# Patient Record
Sex: MALE | Race: WHITE | NOT HISPANIC OR LATINO
[De-identification: names, ages, dates, MRNs, and addresses within clinical notes are randomized per-mention and may not be internally consistent; named-entity substitution may affect disease eponyms.]

---

## 2018-04-03 ENCOUNTER — FORM ENCOUNTER (OUTPATIENT)
Age: 68
End: 2018-04-03

## 2018-04-04 ENCOUNTER — APPOINTMENT (OUTPATIENT)
Dept: MRI IMAGING | Facility: HOSPITAL | Age: 68
End: 2018-04-04

## 2018-04-04 ENCOUNTER — OUTPATIENT (OUTPATIENT)
Dept: OUTPATIENT SERVICES | Facility: HOSPITAL | Age: 68
LOS: 1 days | End: 2018-04-04
Payer: MEDICARE

## 2018-04-04 DIAGNOSIS — Z00.8 ENCOUNTER FOR OTHER GENERAL EXAMINATION: ICD-10-CM

## 2018-04-04 PROCEDURE — 72148 MRI LUMBAR SPINE W/O DYE: CPT | Mod: 26

## 2018-04-04 PROCEDURE — 72148 MRI LUMBAR SPINE W/O DYE: CPT

## 2018-04-05 ENCOUNTER — APPOINTMENT (OUTPATIENT)
Dept: INTERNAL MEDICINE | Facility: CLINIC | Age: 68
End: 2018-04-05
Payer: MEDICARE

## 2018-04-05 VITALS
RESPIRATION RATE: 14 BRPM | HEART RATE: 75 BPM | WEIGHT: 218 LBS | HEIGHT: 70 IN | DIASTOLIC BLOOD PRESSURE: 92 MMHG | BODY MASS INDEX: 31.21 KG/M2 | SYSTOLIC BLOOD PRESSURE: 152 MMHG

## 2018-04-05 DIAGNOSIS — Z00.00 ENCOUNTER FOR GENERAL ADULT MEDICAL EXAMINATION W/OUT ABNORMAL FINDINGS: ICD-10-CM

## 2018-04-05 DIAGNOSIS — Z78.9 OTHER SPECIFIED HEALTH STATUS: ICD-10-CM

## 2018-04-05 PROCEDURE — 99214 OFFICE O/P EST MOD 30 MIN: CPT

## 2018-04-05 PROCEDURE — G0439: CPT

## 2018-04-05 RX ORDER — ASPIRIN 81 MG
81 TABLET, DELAYED RELEASE (ENTERIC COATED) ORAL DAILY
Qty: 30 | Refills: 0 | Status: ACTIVE | COMMUNITY

## 2018-04-05 RX ORDER — ENALAPRIL MALEATE 5 MG/1
5 TABLET ORAL
Refills: 0 | Status: DISCONTINUED | COMMUNITY
End: 2018-04-05

## 2018-05-22 ENCOUNTER — FORM ENCOUNTER (OUTPATIENT)
Age: 68
End: 2018-05-22

## 2018-05-23 ENCOUNTER — APPOINTMENT (OUTPATIENT)
Dept: MRI IMAGING | Facility: HOSPITAL | Age: 68
End: 2018-05-23
Payer: MEDICARE

## 2018-05-23 ENCOUNTER — OUTPATIENT (OUTPATIENT)
Dept: OUTPATIENT SERVICES | Facility: HOSPITAL | Age: 68
LOS: 1 days | End: 2018-05-23
Payer: MEDICARE

## 2018-05-23 DIAGNOSIS — Z00.8 ENCOUNTER FOR OTHER GENERAL EXAMINATION: ICD-10-CM

## 2018-05-23 PROCEDURE — 73721 MRI JNT OF LWR EXTRE W/O DYE: CPT

## 2018-05-23 PROCEDURE — 73721 MRI JNT OF LWR EXTRE W/O DYE: CPT | Mod: 26,LT

## 2018-05-24 ENCOUNTER — APPOINTMENT (OUTPATIENT)
Dept: INTERNAL MEDICINE | Facility: CLINIC | Age: 68
End: 2018-05-24

## 2018-06-18 LAB
25(OH)D3 SERPL-MCNC: 18.6 NG/ML
ALBUMIN SERPL ELPH-MCNC: 3.9 G/DL
ALP BLD-CCNC: 71 U/L
ALT SERPL-CCNC: 13 U/L
ANION GAP SERPL CALC-SCNC: 14 MMOL/L
APPEARANCE: CLEAR
AST SERPL-CCNC: 13 U/L
BASOPHILS # BLD AUTO: 0.04 K/UL
BASOPHILS NFR BLD AUTO: 0.5 %
BILIRUB SERPL-MCNC: 0.5 MG/DL
BILIRUBIN URINE: NEGATIVE
BLOOD URINE: NEGATIVE
BUN SERPL-MCNC: 13 MG/DL
CALCIUM SERPL-MCNC: 8.9 MG/DL
CHLORIDE SERPL-SCNC: 106 MMOL/L
CHOLEST SERPL-MCNC: 155 MG/DL
CHOLEST/HDLC SERPL: 3.2 RATIO
CO2 SERPL-SCNC: 25 MMOL/L
COLOR: YELLOW
CREAT SERPL-MCNC: 0.75 MG/DL
CRP SERPL HS-MCNC: 0.3 MG/L
EOSINOPHIL # BLD AUTO: 0.37 K/UL
EOSINOPHIL NFR BLD AUTO: 4.7 %
GLUCOSE BS SERPL-MCNC: 95 MG/DL
GLUCOSE QUALITATIVE U: NEGATIVE MG/DL
GLUCOSE SERPL-MCNC: 100 MG/DL
HBA1C MFR BLD HPLC: 5.7 %
HCT VFR BLD CALC: 46.2 %
HDLC SERPL-MCNC: 49 MG/DL
HGB BLD-MCNC: 15 G/DL
IMM GRANULOCYTES NFR BLD AUTO: 0.1 %
KETONES URINE: NEGATIVE
LDLC SERPL CALC-MCNC: 91 MG/DL
LEUKOCYTE ESTERASE URINE: NEGATIVE
LYMPHOCYTES # BLD AUTO: 2.16 K/UL
LYMPHOCYTES NFR BLD AUTO: 27.4 %
MAN DIFF?: NORMAL
MCHC RBC-ENTMCNC: 28.9 PG
MCHC RBC-ENTMCNC: 32.5 GM/DL
MCV RBC AUTO: 89 FL
MONOCYTES # BLD AUTO: 0.7 K/UL
MONOCYTES NFR BLD AUTO: 8.9 %
NEUTROPHILS # BLD AUTO: 4.61 K/UL
NEUTROPHILS NFR BLD AUTO: 58.4 %
NITRITE URINE: NEGATIVE
PH URINE: 7
PLATELET # BLD AUTO: 318 K/UL
POTASSIUM SERPL-SCNC: 4.6 MMOL/L
PROT SERPL-MCNC: 6.2 G/DL
PROTEIN URINE: NEGATIVE MG/DL
PSA FREE FLD-MCNC: 34.4
PSA FREE SERPL-MCNC: 0.21 NG/ML
PSA SERPL-MCNC: 0.61 NG/ML
RBC # BLD: 5.19 M/UL
RBC # FLD: 14.6 %
SODIUM SERPL-SCNC: 145 MMOL/L
SPECIFIC GRAVITY URINE: 1.01
T4 FREE SERPL-MCNC: 1.3 NG/DL
TRIGL SERPL-MCNC: 74 MG/DL
TSH SERPL-ACNC: 2.51 UIU/ML
UROBILINOGEN URINE: 1 MG/DL
VIT B12 SERPL-MCNC: 384 PG/ML
WBC # FLD AUTO: 7.89 K/UL

## 2018-10-05 ENCOUNTER — RX RENEWAL (OUTPATIENT)
Age: 68
End: 2018-10-05

## 2019-04-12 ENCOUNTER — RX RENEWAL (OUTPATIENT)
Age: 69
End: 2019-04-12

## 2019-10-17 ENCOUNTER — RX RENEWAL (OUTPATIENT)
Age: 69
End: 2019-10-17

## 2019-11-16 ENCOUNTER — APPOINTMENT (OUTPATIENT)
Dept: RADIOLOGY | Facility: HOSPITAL | Age: 69
End: 2019-11-16
Payer: MEDICARE

## 2019-11-16 ENCOUNTER — OUTPATIENT (OUTPATIENT)
Dept: OUTPATIENT SERVICES | Facility: HOSPITAL | Age: 69
LOS: 1 days | End: 2019-11-16
Payer: MEDICARE

## 2019-11-16 DIAGNOSIS — Z00.8 ENCOUNTER FOR OTHER GENERAL EXAMINATION: ICD-10-CM

## 2019-11-16 PROCEDURE — 73610 X-RAY EXAM OF ANKLE: CPT

## 2019-11-16 PROCEDURE — 73610 X-RAY EXAM OF ANKLE: CPT | Mod: 26,RT

## 2020-04-16 ENCOUNTER — RX RENEWAL (OUTPATIENT)
Age: 70
End: 2020-04-16

## 2020-04-25 ENCOUNTER — MESSAGE (OUTPATIENT)
Age: 70
End: 2020-04-25

## 2020-05-07 LAB
SARS-COV-2 IGG SERPL IA-ACNC: 0.1 INDEX
SARS-COV-2 IGG SERPL QL IA: NEGATIVE

## 2020-10-16 ENCOUNTER — RX RENEWAL (OUTPATIENT)
Age: 70
End: 2020-10-16

## 2020-10-16 DIAGNOSIS — I10 ESSENTIAL (PRIMARY) HYPERTENSION: ICD-10-CM

## 2020-10-16 DIAGNOSIS — M54.16 RADICULOPATHY, LUMBAR REGION: ICD-10-CM

## 2020-10-16 DIAGNOSIS — I25.10 ATHEROSCLEROTIC HEART DISEASE OF NATIVE CORONARY ARTERY W/OUT ANGINA PECTORIS: ICD-10-CM

## 2020-10-16 DIAGNOSIS — E78.5 HYPERLIPIDEMIA, UNSPECIFIED: ICD-10-CM

## 2021-04-18 ENCOUNTER — RX RENEWAL (OUTPATIENT)
Age: 71
End: 2021-04-18

## 2021-06-03 ENCOUNTER — EMERGENCY (EMERGENCY)
Facility: HOSPITAL | Age: 71
LOS: 1 days | Discharge: HOME | End: 2021-06-03
Attending: EMERGENCY MEDICINE | Admitting: EMERGENCY MEDICINE
Payer: MEDICARE

## 2021-06-03 VITALS — SYSTOLIC BLOOD PRESSURE: 159 MMHG | DIASTOLIC BLOOD PRESSURE: 79 MMHG | HEART RATE: 90 BPM | RESPIRATION RATE: 18 BRPM

## 2021-06-03 VITALS
HEIGHT: 70 IN | OXYGEN SATURATION: 98 % | HEART RATE: 90 BPM | SYSTOLIC BLOOD PRESSURE: 200 MMHG | RESPIRATION RATE: 18 BRPM | WEIGHT: 210.1 LBS | DIASTOLIC BLOOD PRESSURE: 100 MMHG | TEMPERATURE: 98 F

## 2021-06-03 LAB
ALBUMIN SERPL ELPH-MCNC: 3.7 G/DL — SIGNIFICANT CHANGE UP (ref 3.3–5)
ALP SERPL-CCNC: 87 U/L — SIGNIFICANT CHANGE UP (ref 40–120)
ALT FLD-CCNC: 21 U/L — SIGNIFICANT CHANGE UP (ref 10–45)
ANION GAP SERPL CALC-SCNC: 5 MMOL/L — SIGNIFICANT CHANGE UP (ref 5–17)
APTT BLD: 31.5 SEC — SIGNIFICANT CHANGE UP (ref 27.5–35.5)
AST SERPL-CCNC: 20 U/L — SIGNIFICANT CHANGE UP (ref 10–40)
BASOPHILS # BLD AUTO: 0.07 K/UL — SIGNIFICANT CHANGE UP (ref 0–0.2)
BASOPHILS NFR BLD AUTO: 0.8 % — SIGNIFICANT CHANGE UP (ref 0–2)
BILIRUB SERPL-MCNC: 0.8 MG/DL — SIGNIFICANT CHANGE UP (ref 0.2–1.2)
BLD GP AB SCN SERPL QL: SIGNIFICANT CHANGE UP
BUN SERPL-MCNC: 14 MG/DL — SIGNIFICANT CHANGE UP (ref 7–23)
CALCIUM SERPL-MCNC: 9.1 MG/DL — SIGNIFICANT CHANGE UP (ref 8.4–10.5)
CHLORIDE SERPL-SCNC: 108 MMOL/L — SIGNIFICANT CHANGE UP (ref 96–108)
CO2 SERPL-SCNC: 30 MMOL/L — SIGNIFICANT CHANGE UP (ref 22–31)
CREAT SERPL-MCNC: 0.84 MG/DL — SIGNIFICANT CHANGE UP (ref 0.5–1.3)
EOSINOPHIL # BLD AUTO: 0.27 K/UL — SIGNIFICANT CHANGE UP (ref 0–0.5)
EOSINOPHIL NFR BLD AUTO: 3.2 % — SIGNIFICANT CHANGE UP (ref 0–6)
GLUCOSE SERPL-MCNC: 102 MG/DL — HIGH (ref 70–99)
HCT VFR BLD CALC: 38 % — LOW (ref 39–50)
HGB BLD-MCNC: 12.6 G/DL — LOW (ref 13–17)
IMM GRANULOCYTES NFR BLD AUTO: 0.5 % — SIGNIFICANT CHANGE UP (ref 0–1.5)
INR BLD: 1.03 RATIO — SIGNIFICANT CHANGE UP (ref 0.88–1.16)
LYMPHOCYTES # BLD AUTO: 2.15 K/UL — SIGNIFICANT CHANGE UP (ref 1–3.3)
LYMPHOCYTES # BLD AUTO: 25.7 % — SIGNIFICANT CHANGE UP (ref 13–44)
MCHC RBC-ENTMCNC: 28.8 PG — SIGNIFICANT CHANGE UP (ref 27–34)
MCHC RBC-ENTMCNC: 33.2 GM/DL — SIGNIFICANT CHANGE UP (ref 32–36)
MCV RBC AUTO: 87 FL — SIGNIFICANT CHANGE UP (ref 80–100)
MONOCYTES # BLD AUTO: 0.81 K/UL — SIGNIFICANT CHANGE UP (ref 0–0.9)
MONOCYTES NFR BLD AUTO: 9.7 % — SIGNIFICANT CHANGE UP (ref 2–14)
NEUTROPHILS # BLD AUTO: 5.02 K/UL — SIGNIFICANT CHANGE UP (ref 1.8–7.4)
NEUTROPHILS NFR BLD AUTO: 60.1 % — SIGNIFICANT CHANGE UP (ref 43–77)
NRBC # BLD: 0 /100 WBCS — SIGNIFICANT CHANGE UP (ref 0–0)
PLATELET # BLD AUTO: 287 K/UL — SIGNIFICANT CHANGE UP (ref 150–400)
POTASSIUM SERPL-MCNC: 4.1 MMOL/L — SIGNIFICANT CHANGE UP (ref 3.5–5.3)
POTASSIUM SERPL-SCNC: 4.1 MMOL/L — SIGNIFICANT CHANGE UP (ref 3.5–5.3)
PROT SERPL-MCNC: 7.5 G/DL — SIGNIFICANT CHANGE UP (ref 6–8.3)
PROTHROM AB SERPL-ACNC: 12.5 SEC — SIGNIFICANT CHANGE UP (ref 10.6–13.6)
RBC # BLD: 4.37 M/UL — SIGNIFICANT CHANGE UP (ref 4.2–5.8)
RBC # FLD: 13.6 % — SIGNIFICANT CHANGE UP (ref 10.3–14.5)
SARS-COV-2 RNA SPEC QL NAA+PROBE: SIGNIFICANT CHANGE UP
SODIUM SERPL-SCNC: 143 MMOL/L — SIGNIFICANT CHANGE UP (ref 135–145)
WBC # BLD: 8.36 K/UL — SIGNIFICANT CHANGE UP (ref 3.8–10.5)
WBC # FLD AUTO: 8.36 K/UL — SIGNIFICANT CHANGE UP (ref 3.8–10.5)

## 2021-06-03 PROCEDURE — 86901 BLOOD TYPING SEROLOGIC RH(D): CPT

## 2021-06-03 PROCEDURE — 85730 THROMBOPLASTIN TIME PARTIAL: CPT

## 2021-06-03 PROCEDURE — 99284 EMERGENCY DEPT VISIT MOD MDM: CPT | Mod: 25

## 2021-06-03 PROCEDURE — 85025 COMPLETE CBC W/AUTO DIFF WBC: CPT

## 2021-06-03 PROCEDURE — 86900 BLOOD TYPING SEROLOGIC ABO: CPT

## 2021-06-03 PROCEDURE — 74177 CT ABD & PELVIS W/CONTRAST: CPT | Mod: 26,MA

## 2021-06-03 PROCEDURE — 71260 CT THORAX DX C+: CPT

## 2021-06-03 PROCEDURE — 85610 PROTHROMBIN TIME: CPT

## 2021-06-03 PROCEDURE — 36415 COLL VENOUS BLD VENIPUNCTURE: CPT

## 2021-06-03 PROCEDURE — 74177 CT ABD & PELVIS W/CONTRAST: CPT

## 2021-06-03 PROCEDURE — 86850 RBC ANTIBODY SCREEN: CPT

## 2021-06-03 PROCEDURE — 71260 CT THORAX DX C+: CPT | Mod: 26,MA

## 2021-06-03 PROCEDURE — 99284 EMERGENCY DEPT VISIT MOD MDM: CPT

## 2021-06-03 PROCEDURE — 80053 COMPREHEN METABOLIC PANEL: CPT

## 2021-06-03 PROCEDURE — 87635 SARS-COV-2 COVID-19 AMP PRB: CPT

## 2021-06-03 RX ORDER — AMLODIPINE BESYLATE 2.5 MG/1
1 TABLET ORAL
Qty: 0 | Refills: 0 | DISCHARGE

## 2021-06-03 RX ORDER — LISINOPRIL 2.5 MG/1
0 TABLET ORAL
Qty: 0 | Refills: 0 | DISCHARGE

## 2021-06-03 RX ORDER — ATORVASTATIN CALCIUM 80 MG/1
1 TABLET, FILM COATED ORAL
Qty: 0 | Refills: 0 | DISCHARGE

## 2021-06-03 NOTE — ED ADULT TRIAGE NOTE - CHIEF COMPLAINT QUOTE
I fell and have some subcutaneous bleeding. Dr Wing is going to meet me here. I need a CT scan and a CBC

## 2021-06-03 NOTE — ED PROVIDER NOTE - PATIENT PORTAL LINK FT
You can access the FollowMyHealth Patient Portal offered by [facility] by registering at the following website: [email or web address]. By joining [employer]’s FollowMyHealth portal, you will also be able to view your health information using other applications (apps) compatible with our system.

## 2021-06-03 NOTE — ED PROVIDER NOTE - ATTENDING CONTRIBUTION TO CARE
Eval with ARAMIS Gallegos. Patient with right flank and abd hematoma s/p fall 4 days ago. Expanding area of bruising. Sent by Dr Wing for labs and CT.   D/W Dr Wing. CT without deep intraabdominal or chest cavity findings. Superficial hematoma/bruising. Stable for d/c. H/H normal.     I performed a face to face bedside interview with patient regarding history of present illness, review of symptoms and past medical history. I completed an independent physical exam.  I have discussed the patient's plan of care with Physician Assistant (PA). I agree with note as stated above, having amended the EMR as needed to reflect my findings.   This includes History of Present Illness, HIV, Past Medical/Surgical/Family/Social History, Allergies and Home Medications, Review of Systems, Physical Exam, and any Progress Notes during the time I functioned as the attending physician for this patient.

## 2021-06-03 NOTE — ED PROVIDER NOTE - NSFOLLOWUPINSTRUCTIONS_ED_ALL_ED_FT
Hematoma    A hematoma is a collection of blood. A hematoma can happen:  •Under the skin.      •In an organ.      •In a body space.      •In a joint space.      •In other tissues.      The blood can thicken (clot) to form a lump that you can see and feel. The lump is often hard and may become sore and tender. The lump can be very small or very big. Most hematomas get better in a few days to weeks. However, some hematomas may be serious and need medical care.      What are the causes?  This condition is caused by:  •An injury.      •Blood that leaks under the skin.      •Problems from surgeries.      •Medical conditions that cause bleeding or bruising.        What increases the risk?  You are more likely to develop this condition if:  •You are an older adult.      •You use medicines that thin your blood.        What are the signs or symptoms?  Symptoms depend on where the hematoma is in your body.•If the hematoma is under the skin, there is:  •A firm lump on the body.       •Pain and tenderness in the area.       •Bruising. The skin above the lump may be blue, dark blue, purple-red, or yellowish.      •If the hematoma is deep in the tissues or body spaces, there may be:  •Blood in the stomach. This may cause pain in the belly (abdomen), weakness, passing out (fainting), and shortness of breath.      •Blood in the head. This may cause a headache, weakness, trouble speaking or understanding speech, or passing out.          How is this diagnosed?  This condition is diagnosed based on:  •Your medical history.       •A physical exam.       •Imaging tests, such as ultrasound or CT scan.      •Blood tests.        How is this treated?  Treatment depends on the cause, size, and location of the hematoma. Treatment may include:  •Doing nothing. Many hematomas go away on their own without treatment.      •Surgery or close monitoring. This may be needed for large hematomas or hematomas that affect the body's organs.      •Medicines. These may be given if a medical condition caused the hematoma.        Follow these instructions at home:      Managing pain, stiffness, and swelling    •If told, put ice on the area.  •Put ice in a plastic bag.      •Place a towel between your skin and the bag.      •Leave the ice on for 20 minutes, 2–3 times a day for the first two days.      •If told, put heat on the affected area after putting ice on the area for two days. Use the heat source that your doctor tells you to use. This could be a moist heat pack or a heating pad. To do this:  •Place a towel between your skin and the heat source.      •Leave the heat on for 20–30 minutes.      •Remove the heat if your skin turns bright red. This is very important if you are unable to feel pain, heat, or cold. You may have a greater risk of getting burned.        •Raise (elevate) the affected area above the level of your heart while you are sitting or lying down.      •Wrap the affected area with an elastic bandage, if told by your doctor. Do not wrap the bandage too tightly.      •If your hematoma is on a leg or foot and is painful, your doctor may give you crutches. Use them as told by your doctor.      General instructions     •Take over-the-counter and prescription medicines only as told by your doctor.      •Keep all follow-up visits as told by your doctor. This is important.        Contact a doctor if:    •You have a fever.      •The swelling or bruising gets worse.      •You start to get more hematomas.        Get help right away if:    •Your pain gets worse.      •Your pain is not getting better with medicine.      •Your skin over the hematoma breaks or starts to bleed.    •Your hematoma is in your chest or belly and you:  •Pass out.      •Feel weak.      •Become short of breath.      •You have a hematoma on your scalp that is caused by a fall or injury, and you:  •Have a headache that gets worse.      •Have trouble speaking or understanding speech.      •Become less alert or you pass out.          Summary    •A hematoma is a collection of blood in any part of your body.      •Most hematomas get better on their own in a few days to weeks. Some may need medical care.      •Follow instructions from your doctor about how to care for your hematoma.      •Contact a doctor if the swelling or bruising gets worse, or if you are short of breath.      This information is not intended to replace advice given to you by your health care provider. Make sure you discuss any questions you have with your health care provider.

## 2021-06-03 NOTE — ED PROVIDER NOTE - OBJECTIVE STATEMENT
pt 69 yo m fell 4 days ago and landed on his abdomen. now c/o worsening ecchymosis spreading. no other injuries  denies pain, weakness, numbness, dizziness, lightheadedness. no AC

## 2021-06-03 NOTE — ED PROVIDER NOTE - CLINICAL SUMMARY MEDICAL DECISION MAKING FREE TEXT BOX
pt 69 yo m fell 4 days ago and landed on his abdomen. now c/o worsening ecchymosis spreading. no other injuries  denies pain, weakness, numbness, dizziness, lightheadedness. no AC  Abd:     extensive ecchymosis across abdomen radiating to right flank and right groin. area of superficial swelling RLQ wo fluctuance  h/h wnl. ct shows no active bleed  saw dr. tinoco and will fu as out pt

## 2021-06-03 NOTE — ED PROVIDER NOTE - PHYSICAL EXAMINATION
General:     NAD, well-nourished, well-appearing  Eyes: PERRL  Head:     NC/AT, EOMI, oral mucosa moist  Neck:     trachea midline  Lungs:     CTA b/l  CVS:     RRR  Abd:     extensive ecchymosis across abdomen radiating to right flank and right groin. area of superficial swelling RLQ wo fluctuance  Ext:   abrasion to right knee  Neuro: AAOx3, no sensory/motor deficits

## 2021-06-03 NOTE — ED ADULT NURSE NOTE - NSIMPLEMENTINTERV_GEN_ALL_ED
Implemented All Fall Risk Interventions:  [place] to call system. Call bell, personal items and telephone within reach. Instruct patient to call for assistance. Room bathroom lighting operational. Non-slip footwear when patient is off stretcher. Physically safe environment: no spills, clutter or unnecessary equipment. Stretcher in lowest position, wheels locked, appropriate side rails in place. Provide visual cue, wrist band, yellow gown, etc. Monitor gait and stability. Monitor for mental status changes and reorient to person, place, and time. Review medications for side effects contributing to fall risk. Reinforce activity limits and safety measures with patient and family.

## 2021-06-29 PROBLEM — I10 ESSENTIAL (PRIMARY) HYPERTENSION: Chronic | Status: ACTIVE | Noted: 2021-06-03

## 2021-07-16 ENCOUNTER — APPOINTMENT (OUTPATIENT)
Dept: NEUROLOGY | Facility: CLINIC | Age: 71
End: 2021-07-16
Payer: MEDICARE

## 2021-07-16 VITALS
SYSTOLIC BLOOD PRESSURE: 144 MMHG | WEIGHT: 218 LBS | DIASTOLIC BLOOD PRESSURE: 82 MMHG | BODY MASS INDEX: 31.21 KG/M2 | HEART RATE: 76 BPM | HEIGHT: 70 IN

## 2021-07-16 VITALS
WEIGHT: 218 LBS | BODY MASS INDEX: 31.21 KG/M2 | SYSTOLIC BLOOD PRESSURE: 144 MMHG | HEART RATE: 76 BPM | HEIGHT: 70 IN | DIASTOLIC BLOOD PRESSURE: 82 MMHG

## 2021-07-16 DIAGNOSIS — I77.74 DISSECTION OF VERTEBRAL ARTERY: ICD-10-CM

## 2021-07-16 DIAGNOSIS — I63.9 CEREBRAL INFARCTION, UNSPECIFIED: ICD-10-CM

## 2021-07-16 PROCEDURE — 99213 OFFICE O/P EST LOW 20 MIN: CPT

## 2021-07-16 PROCEDURE — 99203 OFFICE O/P NEW LOW 30 MIN: CPT

## 2021-07-16 NOTE — ASSESSMENT
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  He has chronic findings.  There are other medical comorbidities including hypertension hyperlipidemia\par \par Recommendations: Neurological follow-up yearly or as needed, medical and cardiology follow-up.\par

## 2021-07-16 NOTE — HISTORY OF PRESENT ILLNESS
[FreeTextEntry1] : Dr. 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  He has been left with neurological deficits.  These deficits are chronic and have not improved significantly throughout the years.  He has dysarthria diplopia nystagmus and right hemiataxia.

## 2021-07-16 NOTE — PHYSICAL EXAM
[FreeTextEntry1] : Head:  Normocephalic Neck: Supple nontender no carotid bruits.  Spine:  Nontender negative straight leg raising.\par \par Mental Status:  Alert Oriented X3 Speech is dysarthric.\par \par Cranial Nerves:  PERRL, Fundi normal Visual Fields full extraocular movements reveal nystagmus on left lateral gaze and diplopia, V through XII intact.\par \par Motor: There is right hemiataxia fairly good strength throughout\par \par DTRs: Symmetric and 2+.  Plantars flexor.  No Clonus.\par \par Sensory: There is left-sided sensory changes to pinprick and touch\par \par Gait: Gait is ataxic more to the right than the left..\par

## 2021-10-16 ENCOUNTER — RX RENEWAL (OUTPATIENT)
Age: 71
End: 2021-10-16

## 2021-10-18 ENCOUNTER — OUTPATIENT (OUTPATIENT)
Dept: OUTPATIENT SERVICES | Facility: HOSPITAL | Age: 71
LOS: 1 days | End: 2021-10-18
Payer: MEDICARE

## 2021-10-18 ENCOUNTER — APPOINTMENT (OUTPATIENT)
Dept: RADIOLOGY | Facility: HOSPITAL | Age: 71
End: 2021-10-18
Payer: MEDICARE

## 2021-10-18 DIAGNOSIS — Z00.8 ENCOUNTER FOR OTHER GENERAL EXAMINATION: ICD-10-CM

## 2021-10-18 PROCEDURE — 73560 X-RAY EXAM OF KNEE 1 OR 2: CPT | Mod: 26,LT

## 2021-10-18 PROCEDURE — 73560 X-RAY EXAM OF KNEE 1 OR 2: CPT

## 2022-04-22 ENCOUNTER — RX RENEWAL (OUTPATIENT)
Age: 72
End: 2022-04-22

## 2022-06-09 NOTE — ED ADULT NURSE NOTE - NURSING SKIN BRUISING LOCATION
Pepito Union RN called, states that transportation will be arriving soon,  did not specify ETA to her.    abdomen

## 2022-10-22 ENCOUNTER — RX RENEWAL (OUTPATIENT)
Age: 72
End: 2022-10-22

## 2022-10-22 RX ORDER — ATORVASTATIN CALCIUM 20 MG/1
20 TABLET, FILM COATED ORAL
Qty: 90 | Refills: 3 | Status: ACTIVE | COMMUNITY
Start: 2018-10-05 | End: 1900-01-01

## 2022-10-22 RX ORDER — LISINOPRIL 20 MG/1
20 TABLET ORAL DAILY
Qty: 90 | Refills: 1 | Status: ACTIVE | COMMUNITY
Start: 2018-10-05 | End: 1900-01-01

## 2022-10-22 RX ORDER — AMLODIPINE BESYLATE 10 MG/1
10 TABLET ORAL DAILY
Qty: 90 | Refills: 3 | Status: ACTIVE | COMMUNITY
Start: 2018-04-05 | End: 1900-01-01

## 2023-09-26 ENCOUNTER — APPOINTMENT (OUTPATIENT)
Dept: NEUROLOGY | Facility: CLINIC | Age: 73
End: 2023-09-26

## 2023-10-01 PROBLEM — M54.16 LUMBAR RADICULAR PAIN: Status: ACTIVE | Noted: 2018-04-05

## 2024-05-17 ENCOUNTER — APPOINTMENT (OUTPATIENT)
Dept: SURGERY | Facility: CLINIC | Age: 74
End: 2024-05-17
Payer: MEDICARE

## 2024-05-17 ENCOUNTER — LABORATORY RESULT (OUTPATIENT)
Age: 74
End: 2024-05-17

## 2024-05-17 VITALS — TEMPERATURE: 97.6 F | RESPIRATION RATE: 16 BRPM | HEIGHT: 70 IN | HEART RATE: 84 BPM | OXYGEN SATURATION: 96 %

## 2024-05-17 PROCEDURE — 11422 EXC H-F-NK-SP B9+MARG 1.1-2: CPT

## 2024-05-17 RX ORDER — PREDNISONE 10 MG/1
10 TABLET ORAL TWICE DAILY
Qty: 60 | Refills: 0 | Status: COMPLETED | COMMUNITY
Start: 2018-04-24 | End: 2024-05-17

## 2024-05-17 RX ORDER — METHYLPREDNISOLONE 4 MG/1
4 TABLET ORAL
Qty: 1 | Refills: 1 | Status: COMPLETED | COMMUNITY
Start: 2018-04-05 | End: 2024-05-17

## 2024-05-17 RX ORDER — AMLODIPINE BESYLATE 5 MG/1
5 TABLET ORAL
Refills: 0 | Status: COMPLETED | COMMUNITY
End: 2024-05-17

## 2024-05-17 NOTE — PROCEDURE
[FreeTextEntry1] : 1.0 % xylocaine  Wide excision mass of left side of neck 1.5 cm mass Large artery supplying base of mass 5, #5-0 nylon sutures

## 2024-05-29 ENCOUNTER — APPOINTMENT (OUTPATIENT)
Dept: SURGERY | Facility: CLINIC | Age: 74
End: 2024-05-29
Payer: MEDICARE

## 2024-05-29 VITALS — TEMPERATURE: 98.7 F | HEIGHT: 70 IN

## 2024-05-29 DIAGNOSIS — R22.1 LOCALIZED SWELLING, MASS AND LUMP, NECK: ICD-10-CM

## 2024-05-29 PROCEDURE — 99024 POSTOP FOLLOW-UP VISIT: CPT

## 2024-05-30 PROBLEM — R22.1 NECK MASS: Status: ACTIVE | Noted: 2024-05-17

## 2024-10-28 ENCOUNTER — NON-APPOINTMENT (OUTPATIENT)
Age: 74
End: 2024-10-28

## 2024-10-28 ENCOUNTER — APPOINTMENT (OUTPATIENT)
Dept: NEUROLOGY | Facility: CLINIC | Age: 74
End: 2024-10-28
Payer: MEDICARE

## 2024-10-28 VITALS
WEIGHT: 218 LBS | DIASTOLIC BLOOD PRESSURE: 76 MMHG | TEMPERATURE: 97.5 F | SYSTOLIC BLOOD PRESSURE: 172 MMHG | HEIGHT: 70 IN | BODY MASS INDEX: 31.21 KG/M2 | OXYGEN SATURATION: 98 % | HEART RATE: 73 BPM

## 2024-10-28 VITALS
TEMPERATURE: 97.5 F | OXYGEN SATURATION: 98 % | HEART RATE: 73 BPM | SYSTOLIC BLOOD PRESSURE: 172 MMHG | DIASTOLIC BLOOD PRESSURE: 76 MMHG | BODY MASS INDEX: 31.21 KG/M2 | WEIGHT: 218 LBS | HEIGHT: 70 IN

## 2024-10-28 DIAGNOSIS — I77.74 DISSECTION OF VERTEBRAL ARTERY: ICD-10-CM

## 2024-10-28 PROCEDURE — 99204 OFFICE O/P NEW MOD 45 MIN: CPT

## 2024-10-28 PROCEDURE — G2211 COMPLEX E/M VISIT ADD ON: CPT
